# Patient Record
Sex: MALE | Race: WHITE | Employment: OTHER | URBAN - METROPOLITAN AREA
[De-identification: names, ages, dates, MRNs, and addresses within clinical notes are randomized per-mention and may not be internally consistent; named-entity substitution may affect disease eponyms.]

---

## 2017-09-22 ENCOUNTER — OFFICE VISIT (OUTPATIENT)
Dept: PULMONOLOGY | Age: 82
End: 2017-09-22

## 2017-09-22 ENCOUNTER — TELEPHONE (OUTPATIENT)
Dept: PULMONOLOGY | Age: 82
End: 2017-09-22

## 2017-09-22 VITALS
DIASTOLIC BLOOD PRESSURE: 66 MMHG | SYSTOLIC BLOOD PRESSURE: 120 MMHG | HEIGHT: 66 IN | OXYGEN SATURATION: 93 % | HEART RATE: 62 BPM | BODY MASS INDEX: 29.57 KG/M2 | WEIGHT: 184 LBS

## 2017-09-22 DIAGNOSIS — J44.9 CHRONIC OBSTRUCTIVE PULMONARY DISEASE, UNSPECIFIED COPD TYPE (HCC): Primary | ICD-10-CM

## 2017-09-22 DIAGNOSIS — J96.11 CHRONIC RESPIRATORY FAILURE WITH HYPOXIA (HCC): ICD-10-CM

## 2017-09-22 PROCEDURE — 99205 OFFICE O/P NEW HI 60 MIN: CPT | Performed by: INTERNAL MEDICINE

## 2017-09-22 RX ORDER — MECLIZINE HYDROCHLORIDE 25 MG/1
25 TABLET ORAL 3 TIMES DAILY PRN
COMMUNITY

## 2017-09-22 RX ORDER — TAMSULOSIN HYDROCHLORIDE 0.4 MG/1
0.4 CAPSULE ORAL DAILY
COMMUNITY

## 2017-09-22 RX ORDER — ALBUTEROL SULFATE 2.5 MG/3ML
2.5 SOLUTION RESPIRATORY (INHALATION) EVERY 6 HOURS PRN
Qty: 120 EACH | Refills: 5 | Status: SHIPPED | OUTPATIENT
Start: 2017-09-22

## 2017-09-22 RX ORDER — ALBUTEROL SULFATE 90 UG/1
2 AEROSOL, METERED RESPIRATORY (INHALATION) EVERY 6 HOURS PRN
Qty: 1 INHALER | Refills: 5 | Status: SHIPPED | OUTPATIENT
Start: 2017-09-22

## 2017-09-22 RX ORDER — ALBUTEROL SULFATE 90 UG/1
2 AEROSOL, METERED RESPIRATORY (INHALATION) EVERY 6 HOURS PRN
COMMUNITY

## 2017-09-22 RX ORDER — FINASTERIDE 5 MG/1
5 TABLET, FILM COATED ORAL DAILY
COMMUNITY

## 2017-09-22 RX ORDER — ALLOPURINOL 300 MG/1
300 TABLET ORAL DAILY
COMMUNITY

## 2017-09-22 RX ORDER — DILTIAZEM HYDROCHLORIDE 120 MG/1
120 CAPSULE, EXTENDED RELEASE ORAL DAILY
COMMUNITY

## 2017-09-22 RX ORDER — ATORVASTATIN CALCIUM 10 MG/1
10 TABLET, FILM COATED ORAL DAILY
COMMUNITY

## 2017-09-22 RX ORDER — BUDESONIDE AND FORMOTEROL FUMARATE DIHYDRATE 160; 4.5 UG/1; UG/1
2 AEROSOL RESPIRATORY (INHALATION) 2 TIMES DAILY
COMMUNITY

## 2017-09-22 RX ORDER — FUROSEMIDE 20 MG/1
20 TABLET ORAL 2 TIMES DAILY
COMMUNITY

## 2017-09-22 RX ORDER — SPIRONOLACTONE 25 MG/1
25 TABLET ORAL DAILY
COMMUNITY

## 2017-09-22 RX ORDER — HYDROCODONE BITARTRATE AND ACETAMINOPHEN 5; 325 MG/1; MG/1
1 TABLET ORAL EVERY 6 HOURS PRN
COMMUNITY

## 2017-11-02 ENCOUNTER — OFFICE VISIT (OUTPATIENT)
Dept: PULMONOLOGY | Age: 82
End: 2017-11-02

## 2017-11-02 ENCOUNTER — HOSPITAL ENCOUNTER (OUTPATIENT)
Dept: PULMONOLOGY | Age: 82
Discharge: HOME | End: 2017-11-02
Attending: INTERNAL MEDICINE | Admitting: INTERNAL MEDICINE

## 2017-11-02 VITALS
OXYGEN SATURATION: 93 % | RESPIRATION RATE: 18 BRPM | WEIGHT: 186 LBS | HEART RATE: 81 BPM | BODY MASS INDEX: 29.89 KG/M2 | HEIGHT: 66 IN | TEMPERATURE: 98.7 F | DIASTOLIC BLOOD PRESSURE: 68 MMHG | SYSTOLIC BLOOD PRESSURE: 122 MMHG

## 2017-11-02 DIAGNOSIS — J44.9 CHRONIC OBSTRUCTIVE PULMONARY DISEASE, UNSPECIFIED COPD TYPE (HCC): ICD-10-CM

## 2017-11-02 DIAGNOSIS — J96.11 CHRONIC RESPIRATORY FAILURE WITH HYPOXIA, ON HOME O2 THERAPY (HCC): ICD-10-CM

## 2017-11-02 DIAGNOSIS — Z99.81 CHRONIC RESPIRATORY FAILURE WITH HYPOXIA, ON HOME O2 THERAPY (HCC): ICD-10-CM

## 2017-11-02 DIAGNOSIS — J44.9 COPD, SEVERE (HCC): Primary | ICD-10-CM

## 2017-11-02 DIAGNOSIS — J44.9 CHRONIC OBSTRUCTIVE PULMONARY DISEASE (HCC): ICD-10-CM

## 2017-11-02 PROCEDURE — 99214 OFFICE O/P EST MOD 30 MIN: CPT | Performed by: INTERNAL MEDICINE

## 2017-11-02 RX ORDER — PREDNISONE 10 MG/1
10 TABLET ORAL DAILY
COMMUNITY

## 2017-11-02 RX ORDER — ALBUTEROL SULFATE 2.5 MG/3ML
2.5 SOLUTION RESPIRATORY (INHALATION) EVERY 6 HOURS PRN
Status: DISCONTINUED | OUTPATIENT
Start: 2017-11-02 | End: 2017-11-03 | Stop reason: HOSPADM

## 2017-11-02 RX ADMIN — ALBUTEROL SULFATE 2.5 MG: 2.5 SOLUTION RESPIRATORY (INHALATION) at 11:12

## 2017-11-02 NOTE — PROGRESS NOTES
[facility] Pulmonary, Critical Care, and Sleep    Outpatient Follow Up Note    Chief Complaint: Severe COPD  Consulting provider: Lakesha Keller MD    Interval History: 81 y.o. male SOB is stable. The Incruse has helped. Recent URI or pneumonia treated outpatient and he is back to baseline. Initial HPI:The patient does not have SOB at rest but has BERRY with walking or ADLs. Exercise tolerance on level ground is < 1 block. + SOB with showering or dressing. + less than daily intermittent cough that is usually dry or sometimes productive of white sputum. + wheezes daily. He has been admitted to [facility] several times for COPD or URI in the last year.     Current Outpatient Prescriptions:     predniSONE (DELTASONE) 10 MG tablet, Take 10 mg by mouth daily Take 2 tablets by mouth every day for 2 wks, then take 1 tablet ever day until gone., Disp: , Rfl:     meclizine (ANTIVERT) 25 MG tablet, Take 25 mg by mouth 3 times daily as needed, Disp: , Rfl:     atorvastatin (LIPITOR) 10 MG tablet, Take 10 mg by mouth daily, Disp: , Rfl:     furosemide (LASIX) 20 MG tablet, Take 20 mg by mouth 2 times daily, Disp: , Rfl:     diltiazem (CARDIZEM 12 HR) 120 MG extended release capsule, Take 120 mg by mouth daily, Disp: , Rfl:     finasteride (PROSCAR) 5 MG tablet, Take 5 mg by mouth daily, Disp: , Rfl:     spironolactone (ALDACTONE) 25 MG tablet, Take 25 mg by mouth daily, Disp: , Rfl:     tamsulosin (FLOMAX) 0.4 MG capsule, Take 0.4 mg by mouth daily, Disp: , Rfl:     allopurinol (ZYLOPRIM) 300 MG tablet, Take 300 mg by mouth daily 1/2 tab daily (150 mg daily), Disp: , Rfl:     albuterol sulfate  (90 Base) MCG/ACT inhaler, Inhale 2 puffs into the lungs every 6 hours as needed for Wheezing, Disp: , Rfl:     budesonide-formoterol (SYMBICORT) 160-4.5 MCG/ACT AERO, Inhale 2 puffs into the lungs 2 times daily, Disp: , Rfl:     HYDROcodone-acetaminophen (NORCO) 5-325 MG per tablet, Take 1 tablet by mouth every 6 hours months

## 2017-11-02 NOTE — PATIENT INSTRUCTIONS
might gain a few pounds. But quitting smoking will have a much more positive effect on your health than weighing a few pounds more. Plus, you can help prevent some weight gain by being more active and eating less. Taking the medicine bupropion might help control weight gain. What else can I do to improve my chances of quitting?  You can:  ?Start exercising. ?Stay away from smokers and places that you associate with smoking. If people close to you smoke, ask them to quit with you. ? Keep gum, hard candy, or something to put in your mouth handy. If you get a craving for a cigarette, try one of these instead. ?Dont give up, even if you start smoking again. It takes most people a few tries before they succeed. You can also get help from a free phone line (9-574-QUIT-NOW) or go online to www.smokefree.gov. Your pulmonary team is here to help you quit.   Call for assistance [phone]

## 2018-05-01 ENCOUNTER — TELEPHONE (OUTPATIENT)
Dept: PULMONOLOGY | Age: 83
End: 2018-05-01

## 2019-12-18 ENCOUNTER — TELEPHONE (OUTPATIENT)
Dept: SURGERY | Age: 84
End: 2019-12-18

## 2020-07-24 ENCOUNTER — TELEPHONE (OUTPATIENT)
Dept: INTERVENTIONAL RADIOLOGY/VASCULAR | Age: 85
End: 2020-07-24

## 2020-07-24 NOTE — TELEPHONE ENCOUNTER
7/24/2020--  Terrence Dowell from [facility] called to get patient approved for kyphoplasty based on their recent CT imaging. (Patient unable to have MRI due to their pacemaker that is in place). After reviewing the imaging, Dr. Guerrero Norman has not approved the procedure to be scheduled yet at this time. He states that there is an increased L2 fracture, a new L1 and L4 compression fracture and that T12 may be questionable as well. Due to not being able to perform the MRI, he recommends that a bone scan with SPECT be performed to better evaluate the levels and degree of activity. This will give a better idea of what levels need to be treated first over others. This information was given to Terrence Dowell as well as the PA covering patients care so they can place the order and get this scheduled for patient. They will call when it has been performed for our radiologist to review the new imaging in comparison to the CT scans.

## 2020-07-28 NOTE — PROGRESS NOTES
Patient is inpatient at [facility] for pain control. Patient will be transferred to [facility] for Kyphoplasty. Patient will return to [facility] after procedure. Alfredo Chaves is pts sister [phone] (POA). Eliquis is to be held for 4 doses, COVID test needs completed. Reginald Masterson- made aware. Lindsay- anesthesia  @ [facility] has been made aware.   Belinda PARKER RN

## 2020-07-29 NOTE — TELEPHONE ENCOUNTER
7/28/2020--  Patient had their bone scan with SPECT on 7/27/2020. Dr. Sandy Joseph reviewed the new imaging in conjunction with the recent imaging and deemed that the patient is a candidate for an L1, L2 and L4 kyphoplasty. [facility] faxed us over all his recent progress notes and labs and those were also reviewed. The patient had a positive urine culture and Dr. Farris Collet required that they'd need to start on antibiotics now so that he'd be able to proceed forward with kyphoplasty next Wednesday 8/5/2020 at [facility].

## 2020-08-03 ENCOUNTER — TELEPHONE (OUTPATIENT)
Dept: INTERVENTIONAL RADIOLOGY/VASCULAR | Age: 85
End: 2020-08-03

## 2020-08-03 NOTE — TELEPHONE ENCOUNTER
8/3/2020--  Lana Morgan from [facility] called today in regards to patient's insurance pre-certification. His [place] Medicare [employer]vantage required the ordering provider to submit and initiate the pre-certification claim--they wouldn't let me do it representing the servicing provider. A peer to peer had to be set up today for Dr. Ranjit Francisco to have with [address] [place] [address] and after the Urgent peer to peer (as his 3 level kyphoplasty is scheduled in two days on 8/5/2020) it was approved.      Authorization # [id number]     It is valid from 8/5/2020-11/3/2020

## 2020-08-05 ENCOUNTER — HOSPITAL ENCOUNTER (OUTPATIENT)
Age: 85
Discharge: HOME | End: 2020-08-05
Payer: MEDICARE

## 2020-08-05 ENCOUNTER — HOSPITAL ENCOUNTER (OUTPATIENT)
Dept: INTERVENTIONAL RADIOLOGY/VASCULAR | Age: 85
Discharge: HOME | End: 2020-08-05
Payer: MEDICARE

## 2020-08-05 VITALS
RESPIRATION RATE: 20 BRPM | WEIGHT: 177 LBS | HEIGHT: 66 IN | SYSTOLIC BLOOD PRESSURE: 155 MMHG | OXYGEN SATURATION: 100 % | HEART RATE: 72 BPM | BODY MASS INDEX: 28.45 KG/M2 | DIASTOLIC BLOOD PRESSURE: 64 MMHG

## 2020-08-05 VITALS
SYSTOLIC BLOOD PRESSURE: 175 MMHG | OXYGEN SATURATION: 100 % | HEART RATE: 74 BPM | DIASTOLIC BLOOD PRESSURE: 66 MMHG | RESPIRATION RATE: 20 BRPM

## 2020-08-05 PROCEDURE — C1713 ANCHOR/SCREW BN/BN,TIS/BN: HCPCS

## 2020-08-05 PROCEDURE — 6360000002 HC RX W HCPCS: Performed by: RADIOLOGY

## 2020-08-05 PROCEDURE — 99152 MOD SED SAME PHYS/QHP 5/>YRS: CPT

## 2020-08-05 PROCEDURE — 22514 PERQ VERTEBRAL AUGMENTATION: CPT

## 2020-08-05 PROCEDURE — 22515 PERQ VERTEBRAL AUGMENTATION: CPT

## 2020-08-05 PROCEDURE — 99153 MOD SED SAME PHYS/QHP EA: CPT

## 2020-08-05 RX ORDER — ACETAMINOPHEN 325 MG/1
650 TABLET ORAL EVERY 4 HOURS PRN
Status: DISCONTINUED | OUTPATIENT
Start: 2020-08-05 | End: 2020-08-06 | Stop reason: HOSPADM

## 2020-08-05 RX ORDER — ONDANSETRON 2 MG/ML
4 INJECTION INTRAMUSCULAR; INTRAVENOUS EVERY 8 HOURS PRN
Status: DISCONTINUED | OUTPATIENT
Start: 2020-08-05 | End: 2020-08-06 | Stop reason: HOSPADM

## 2020-08-05 RX ORDER — FENTANYL CITRATE 50 UG/ML
INJECTION, SOLUTION INTRAMUSCULAR; INTRAVENOUS
Status: COMPLETED | OUTPATIENT
Start: 2020-08-05 | End: 2020-08-05

## 2020-08-05 RX ORDER — HYDROCODONE BITARTRATE AND ACETAMINOPHEN 5; 325 MG/1; MG/1
1 TABLET ORAL EVERY 4 HOURS PRN
Status: DISCONTINUED | OUTPATIENT
Start: 2020-08-05 | End: 2020-08-06 | Stop reason: HOSPADM

## 2020-08-05 RX ORDER — KETOROLAC TROMETHAMINE 30 MG/ML
INJECTION, SOLUTION INTRAMUSCULAR; INTRAVENOUS
Status: COMPLETED | OUTPATIENT
Start: 2020-08-05 | End: 2020-08-05

## 2020-08-05 RX ORDER — HYDROCODONE BITARTRATE AND ACETAMINOPHEN 5; 325 MG/1; MG/1
2 TABLET ORAL EVERY 4 HOURS PRN
Status: DISCONTINUED | OUTPATIENT
Start: 2020-08-05 | End: 2020-08-06 | Stop reason: HOSPADM

## 2020-08-05 RX ORDER — MIDAZOLAM HYDROCHLORIDE 5 MG/ML
INJECTION INTRAMUSCULAR; INTRAVENOUS
Status: COMPLETED | OUTPATIENT
Start: 2020-08-05 | End: 2020-08-05

## 2020-08-05 RX ORDER — SODIUM CHLORIDE 9 MG/ML
INJECTION, SOLUTION INTRAVENOUS CONTINUOUS
Status: DISCONTINUED | OUTPATIENT
Start: 2020-08-05 | End: 2020-08-06 | Stop reason: HOSPADM

## 2020-08-05 RX ORDER — KETOROLAC TROMETHAMINE 30 MG/ML
30 INJECTION, SOLUTION INTRAMUSCULAR; INTRAVENOUS ONCE
Status: DISCONTINUED | OUTPATIENT
Start: 2020-08-05 | End: 2020-08-06 | Stop reason: HOSPADM

## 2020-08-05 RX ADMIN — MIDAZOLAM HYDROCHLORIDE 1 MG: 5 INJECTION, SOLUTION INTRAMUSCULAR; INTRAVENOUS at 08:57

## 2020-08-05 RX ADMIN — MIDAZOLAM HYDROCHLORIDE 1 MG: 5 INJECTION, SOLUTION INTRAMUSCULAR; INTRAVENOUS at 08:41

## 2020-08-05 RX ADMIN — KETOROLAC TROMETHAMINE 30 MG: 30 INJECTION, SOLUTION INTRAMUSCULAR at 09:30

## 2020-08-05 RX ADMIN — MIDAZOLAM HYDROCHLORIDE 1 MG: 5 INJECTION, SOLUTION INTRAMUSCULAR; INTRAVENOUS at 08:53

## 2020-08-05 RX ADMIN — Medication 2 G: at 08:42

## 2020-08-05 RX ADMIN — FENTANYL CITRATE 50 MCG: 50 INJECTION INTRAMUSCULAR; INTRAVENOUS at 08:57

## 2020-08-05 RX ADMIN — FENTANYL CITRATE 50 MCG: 50 INJECTION INTRAMUSCULAR; INTRAVENOUS at 09:36

## 2020-08-05 RX ADMIN — FENTANYL CITRATE 50 MCG: 50 INJECTION INTRAMUSCULAR; INTRAVENOUS at 08:53

## 2020-08-05 RX ADMIN — FENTANYL CITRATE 50 MCG: 50 INJECTION INTRAMUSCULAR; INTRAVENOUS at 08:41

## 2020-08-05 NOTE — PRE SEDATION
mouth daily    Historical Provider, MD   allopurinol (ZYLOPRIM) 300 MG tablet Take 300 mg by mouth daily 1/2 tab daily (150 mg daily)    Historical Provider, MD   albuterol sulfate  (90 Base) MCG/ACT inhaler Inhale 2 puffs into the lungs every 6 hours as needed for Wheezing    Historical Provider, MD   budesonide-formoterol (SYMBICORT) 160-4.5 MCG/ACT AERO Inhale 2 puffs into the lungs 2 times daily    Historical Provider, MD   HYDROcodone-acetaminophen (NORCO) 5-325 MG per tablet Take 1 tablet by mouth every 6 hours as needed for Pain . Historical Provider, MD   Ergocalciferol (VITAMIN D2 PO) Take by mouth    Historical Provider, MD   aspirin 81 MG tablet Take 81 mg by mouth daily    Historical Provider, MD   Umeclidinium Bromide 62.5 MCG/INH AEPB 1 inhalation daily 9/22/17   Eunice Spence MD   albuterol (PROVENTIL) (2.5 MG/3ML) 0.083% nebulizer solution Take 3 mLs by nebulization every 6 hours as needed for Wheezing or Shortness of Breath Dx COPD J44.9 9/22/17   Eunice Spence MD   albuterol sulfate HFA (PROVENTIL HFA) 108 (90 Base) MCG/ACT inhaler Inhale 2 puffs into the lungs every 6 hours as needed for Wheezing or Shortness of Breath 9/22/17   Eunice Spence MD     Coumadin Use Last 7 Days:  no  Antiplatelet drug therapy use last 7 days: no  Other anticoagulant use last 7 days: no  Additional Medication Information:  na      Pre-Sedation Documentation and Exam:   I have reviewed the patient's history and review of systems.     Mallampati Airway Assessment:  Mallampati Class II - (soft palate, fauces & uvula are visible)    Prior History of Anesthesia Complications:   none    ASA Classification:  Class 2 - A normal healthy patient with mild systemic disease    Sedation/ Anesthesia Plan:   intravenous sedation    Medications Planned:   midazolam (Versed) intravenously and fentanyl intravenously    Patient is an appropriate candidate for plan of sedation: yes    Electronically signed by Libby Moreland Sintia Farias MD on 8/5/2020 at 8:26 AM

## 2020-08-05 NOTE — PROGRESS NOTES
Pt cleared for discharge. Pt alert and oriented x 4. Denies any pain or numbness/tingling in lower extremities. Surgical site dressing clean, dry, and intact. Vital signs stable see flow sheets. Post Ihsan score 10. Discharge instructions provided and explained. All questions answered. Pt and daughter verbalize understanding of the discharge instructions and state no more questions.  Pt discharged in stable condition with all belongings in care of

## 2020-08-05 NOTE — PROGRESS NOTES
Pt arrived for image guided kyphoplasty. Procedure explained including the risk and benefits of the procedure. All questions answered. Pt verbalizes understanding of the procedure and states no more questions. Consent signed. Vital signs stable see flow sheets. Labs, allergies, medications, and code status reviewed. No Contraindications noted. Pre Ihsan score 9.

## 2020-08-05 NOTE — BRIEF OP NOTE
Brief Operative Note      Patient: Saranya Smith MRN: [id number]     YOB: 1935  Age: 81 y.o. Sex: male        DATE OF PROCEDURE: 8/5/2020     PROCEDURE PERFORMED: Three level kyphoplasty, L1,2 and 4. SURGEON: Bertha Bond MD    ANESTHESIA: Fentanyl, Versed    Estimated Blood Loss:less than 20 cc    Complications: None. Device implanted: cement.            Specimen: none    Electronically signed by Bertha Bond MD on 8/5/2020 at 9:51 AM

## 2020-08-05 NOTE — PROGRESS NOTES
Image guided kyphoplasty of the L1, L2 and L4 completed. Pt tolerated procedure without any signs or symptoms of distress. Vital signs stable see flow sheets. Pt taken to recovery bay for post operative monitoring.

## 2020-12-21 ENCOUNTER — OUTSIDE SERVICES (OUTPATIENT)
Dept: WOUND CARE | Age: 85
End: 2020-12-21
Payer: MEDICARE

## 2020-12-21 PROCEDURE — 99308 SBSQ NF CARE LOW MDM 20: CPT | Performed by: CLINICAL NURSE SPECIALIST

## 2020-12-21 NOTE — PROGRESS NOTES
[facility]    Patient name: Sara Mtz  :   [birth year]  Facility:  [facility] Service: nursing facility (79)    Primary diagnosis for wound-care consultation: Skin tear to right antecubital, right elbow and right wrist    Additional ulcer(s) noted? None    History of Present Illness: Initial visit. Skin tear resultant from fall 2020. Right elbow resolved today. Current treatment is appropriate. Wounds are healing. Topical antibiotic ointment, Adaptic and nonadherent dressing covered with Kerlix. Recent Covid. Appetite is fair to good. 2020: Albumin 2.9. Protein supplements are provided. No fever or chills. Review of Systems: Pertinent systems reviewed in the HPI; all other systems reviewed, and negative. Pertinent elements of past medical, surgical, family, and/or social history: Patient with chronic diastolic congestive heart failure, BPH, COPD, essential hypertension, hyperlipidemia, atherosclerotic heart disease, A. fib, emphysema, major depressive disorder and weakness.     Medications and allergies are detailed in the nursing home chart, and were reviewed by me today.  _______________________    General Physical exam:    Vital signs:  165/61, 97.7, 72, 19    General Appearance: alert and oriented to person, place and time, well developed and well-nourished, in no acute distress  Psychiatric:  Mood and affect appropriate for situation  Skin: warm and dry, no rash  Head: normocephalic and atraumatic  Eyes: pupils equal, round, sclerae anicteric, conjunctivae normal  ENT: no thrush or oral ulcers  Neck:No complaints, normal appearance  Pulmonary/Chest: Respirations easy at rest, supplemental oxygen therapy, no cough or respiratory distress  Cardiovascular: No chest pain, normal rate, toes warm, cap refill normal  Abdomen: No nausea or vomiting  Extremities: no cyanosis, edema or cellulitis Consent obtained. Time out performed per [facility]. _______________________    Recommendations:    - Dressings / Compression / Offloading: Topical antibiotic ointment, Adaptic, nonadherent dressing and gauze wrap.  Leave steri strips in place.    - Labs / Diagnostic studies: None    - Medications / nutritional support: Health shakes twice a day, Protostat every day    - Further Consultations recommended: None    - Anticipated follow-up: Weekly evaluation  _______________________    Electronically signed by ZOILA Velez CNP on 12/21/2020 at 5:34 PM

## 2020-12-28 ENCOUNTER — OUTSIDE SERVICES (OUTPATIENT)
Dept: WOUND CARE | Age: 85
End: 2020-12-28
Payer: MEDICARE

## 2020-12-28 PROCEDURE — 99308 SBSQ NF CARE LOW MDM 20: CPT | Performed by: CLINICAL NURSE SPECIALIST

## 2020-12-28 NOTE — PROGRESS NOTES
[facility]    Patient name: Jack Lucas  :   [birth year]  Facility:    [facility] Service: nursing facility (74)    Primary diagnosis for wound-care consultation: Skin tear to right antecubital and right wrist    Additional ulcer(s) noted? None    History of Present Illness: Skin tear to right wrist resolved today. Right antecubital skin tear healing slowly. Appetite is fair to good. Protein supplements are provided. No fever or chills. Review of Systems: Pertinent systems reviewed in the HPI; all other systems reviewed, and negative. Pertinent elements of past medical, surgical, family, and/or social history: Patient with chronic diastolic congestive heart failure, bilateral leg edema, BPH, COPD, essential hypertension, hyperlipidemia, atherosclerotic heart disease, A. fib, emphysema, major depressive disorder and weakness. Medications and allergies are detailed in the nursing home chart, and were reviewed by me today.  _______________________    General Physical exam:    Vital signs:  149/63, 97.7, 69, 18    General Appearance: alert and oriented to person, place and time, well developed and well-nourished, in no acute distress  Psychiatric:  Mood and affect appropriate for situation  Skin: warm and dry, no rash  Head: normocephalic and atraumatic  Eyes: pupils equal, round, sclerae anicteric, conjunctivae normal  ENT: no thrush or oral ulcers  Neck:No complaints, normal appearance  Pulmonary/Chest: Respirations easy at rest, supplemental O2, no cough or respiratory distress  Cardiovascular: No chest pain, normal rate, toes warm, cap refill normal  Abdomen: No nausea or vomiting  Extremities: no cyanosis or cellulitis. Bilateral leg edema  Musculoskeletal: Wheelchair for distance, moves all extremities, no deformities  Neurologic: distal sensation to light touch intact, no allodynia.       Wound exam:    Wound location: Right antecubital Length (cm) 2.5   Width (cm) 0.6   Depth (cm) 0.1   Tunneling 0   Undermining 0    Wound type:   Skin Tear  Grade - stage - thickness: Full thickness     Description of periwound: Intact    Description of wound bed: Wound with red granulation. Wound bed moist, small amount of serous drainage, edges open and attached. Surrounding tissue and ulcer without signs and symptoms of infection. No purulence, malodor, erythema, increased temperature, or increased pain.  _______________________    Recent labs and data reviewed: 12/17/2020: Hemoglobin/hematocrit: 9.5/30, RBC: 3.37 both improving  _______________________     Fairy Duane diagnoses & assessment: Skin tear to right wrist resolved today. Right antecubital skin tear healing slowly. Appetite is fair to good. Protein supplements are provided. No fever or chills    Debridement is not indicated today, based on the history and exam above.  _______________________    Procedure:    Consent obtained. Time out performed per [facility].      _______________________    Recommendations:    - Dressings / Compression / Offloading: Change treatment to topical antibiotic, Adaptic and adhesive foam.    - Labs / Diagnostic studies: None    - Medications / nutritional support: Health shakes 2 times a day, Prostat daily    - Further Consultations recommended: none    - Anticipated follow-up: Weekly evaluation  _______________________    Electronically signed by ZOILA Menjivar CNP on 12/28/2020 at 5:57 PM

## 2021-01-04 ENCOUNTER — OUTSIDE SERVICES (OUTPATIENT)
Dept: WOUND CARE | Age: 86
End: 2021-01-04
Payer: MEDICARE

## 2021-01-04 DIAGNOSIS — S51.811D SKIN TEAR OF FOREARM WITHOUT COMPLICATION, RIGHT, SUBSEQUENT ENCOUNTER: ICD-10-CM

## 2021-01-04 PROCEDURE — 99308 SBSQ NF CARE LOW MDM 20: CPT | Performed by: CLINICAL NURSE SPECIALIST

## 2021-01-04 NOTE — PROGRESS NOTES
88 [facility]    Patient name: Lawson Huynh  :   [birth year]  Facility:    Service: nursing facility (68)    Primary diagnosis for wound-care consultation: Skin tear to right arm    Additional ulcer(s) noted?  none    History of Present Illness: Wound with red hypergranulation. Wound bed moist, moderate amount of serous drainage, edges open and attached. Surrounding tissue and ulcer without signs and symptoms of infection. No purulence, malodor, erythema, increased temperature, or increased pain. Appetite fair to good. Health shakes and Prostat for protein supplements provided. No fever or chills. Review of Systems: Pertinent systems reviewed in the HPI; all other systems reviewed, and negative. Pertinent elements of past medical, surgical, family, and/or social history: Patient with chronic diastolic congestive heart failure, COPD, BPH, essential hypertension, hyperlipidemia, atherosclerotic heart disease, A. fib, emphysema, major depressive disorder and weakness.     Medications and allergies are detailed in the nursing home chart, and were reviewed by me today.  _______________________    General Physical exam:    Vital signs:  135/39, 97.7, 69, 18    General Appearance: alert and oriented to person, place and time, well developed and well-nourished, in no acute distress  Psychiatric:  Mood and affect appropriate for situation  Skin: warm and dry, no rash  Head: normocephalic and atraumatic  Eyes: pupils equal, round, sclerae anicteric, conjunctivae normal  ENT: no thrush or oral ulcers  Neck:No complaints, normal appearance  Pulmonary/Chest: Respirations easy at rest, no cough or respiratory distress  Cardiovascular: No chest pain, normal rate, toes warm, cap refill normal  Abdomen: No nausea or vomiting  Extremities: no cyanosis, edema or cellulitis  Musculoskeletal: Wheel chair for distance, moves all extremities, no deformities Neurologic: distal sensation to light touch intact, no allodynia. Wound exam:    Wound location: Right arm   Length (cm) 1.4   Width (cm) 0.7   Depth (cm) 0.1   Tunneling 0   Undermining 0    Wound type:   Skin Tear  Grade - stage - thickness: Full thickness     Description of periwound: Intact    Description of wound bed: Wound with red hypergranulation. Wound bed moist, moderate amount of serous drainage, edges open and attached. Surrounding tissue and ulcer without signs and symptoms of infection. No purulence, malodor, erythema, increased temperature, or increased pain.  _______________________    Recent labs and data reviewed: No new labs  _______________________     [facility] diagnoses & assessment: Wound with red hypergranulation. Silver nitrate applied today. Change treatment to collagen foam dressing. Wound bed moist, moderate amount of serous drainage, edges open and attached. Surrounding tissue and ulcer without signs and symptoms of infection. No purulence, malodor, erythema, increased temperature, or increased pain. Appetite fair to good. Health shakes and Prostat for protein supplements provided. No fever or chills. Debridement is not indicated today, based on the history and exam above.  _______________________    Procedure:    Consent obtained. Time out performed per [facility]. To encourage better epithelial cell coverage, I did use AgNO3 to chemically cauterize hypergranulation tissue on the right arm ulcer(s), after application of 4% lidocaine topical solution. This was tolerated well, with no pain or skin injury. _______________________    Recommendations:    - Dressings / Compression / Offloading: Collagen and foam dressing.    - Labs / Diagnostic studies: None    - Medications / nutritional support: Prostat daily.  Health shakes 2 times per day    - Further Consultations recommended: none    - Anticipated follow-up: Weekly evaluation  _______________________ Electronically signed by ZOILA Willis CNP on 1/4/2021 at 4:41 PM

## 2021-01-11 ENCOUNTER — OUTSIDE SERVICES (OUTPATIENT)
Dept: WOUND CARE | Age: 86
End: 2021-01-11
Payer: MEDICARE

## 2021-01-11 DIAGNOSIS — S41.111D SKIN TEAR OF RIGHT UPPER ARM WITHOUT COMPLICATION, SUBSEQUENT ENCOUNTER: ICD-10-CM

## 2021-01-11 PROCEDURE — 17250 CHEM CAUT OF GRANLTJ TISSUE: CPT | Performed by: CLINICAL NURSE SPECIALIST

## 2021-01-11 NOTE — PROGRESS NOTES
88 [facility]    Patient name: Izabela Bustillo  :   [birth year]  Facility:   [facility] Service: nursing facility (22)    Primary diagnosis for wound-care consultation: Skin tear right arm    Additional ulcer(s) noted?  none    History of Present Illness: Skin tear right arm. Healing slowly. No infection. Appetite is fair to good. Protein supplements provided. No fever or chills. Review of Systems: Pertinent systems reviewed in the HPI; all other systems reviewed, and negative. Pertinent elements of past medical, surgical, family, and/or social history: Patient with chronic diastolic CHF, COPD, essentia HTN, major depressive disorder. Thin, fragile skin    Medications and allergies are detailed in the nursing home chart, and were reviewed by me today.  _______________________    General Physical exam:    Vital signs:  130/60, 98, 70, 18    General Appearance: alert and oriented to person, place and time, well developed and well-nourished, in no acute distress  Psychiatric:  Mood and affect appropriate for situation  Skin: warm and dry, no rash  Head: normocephalic and atraumatic  Eyes: pupils equal, round, sclerae anicteric, conjunctivae normal  ENT: no thrush or oral ulcers  Neck:No complaints, normal appearance  Pulmonary/Chest: Respirations easy at rest, supplemental O2, no cough or respiratory distress  Cardiovascular: No chest pain, normal rate, toes warm, cap refill normal  Abdomen: No nausea or vomiting  Extremities: no cyanosis, edema or cellulitis  Musculoskeletal: Wheelchair for distance, moves all extremities, no deformities  Neurologic: distal sensation to light touch intact, no allodynia.       Wound exam:    Wound location: Right arm   Length (cm) 0.8   Width (cm) 0.3   Depth (cm) 0.1   Tunneling 0   Undermining 0    Wound type:   Skin Tear  Grade - stage - thickness: Full thickness     Description of periwound: Intact Description of wound bed: Wound with red hypergranulation. Wound bed moist, small amount of serous drainage, edges open and attached. Surrounding tissue and ulcer without signs and symptoms of infection. No purulence, malodor, erythema, increased temperature, or increased pain.  _______________________    Recent labs and data reviewed: No new labs  _______________________     [facility] diagnoses & assessment: Skin tear right arm. Healing slowly. No infection. Appetite is fair to good. Protein supplements provided. No fever or chills. Debridement is  indicated today, based on the history and exam above.  _______________________    Procedure:    Consent obtained. Time out performed per [facility]. To encourage better epithelial cell coverage, I did use AgNO3 to chemically cauterize hypergranulation tissue on the right arm ulcer(s), after application of 4% lidocaine topical solution. This was tolerated well, with no pain or skin injury.    _______________________    Recommendations:    - Dressings / Compression / Offloading: Alginate ag and adhesive foam. Su sleeves    - Labs / Diagnostic studies: none    - Medications / nutritional support: Health shakes 2 times per day, Prostat daily    - Further Consultations recommended: None    - Anticipated follow-up: Weekly evaluation  _______________________    Electronically signed by ZOILA Aguilar CNP on 1/11/2021 at 2:56 PM

## 2021-01-18 ENCOUNTER — OUTSIDE SERVICES (OUTPATIENT)
Dept: WOUND CARE | Age: 86
End: 2021-01-18
Payer: MEDICARE

## 2021-01-18 DIAGNOSIS — S51.819D: ICD-10-CM

## 2021-01-18 PROCEDURE — 17250 CHEM CAUT OF GRANLTJ TISSUE: CPT | Performed by: CLINICAL NURSE SPECIALIST

## 2021-01-19 NOTE — PROGRESS NOTES
[facility]    Patient name: David Flores  :   [birth year]  Facility:    [facility] Service: nursing facility ([id number])    Primary diagnosis for wound-care consultation: Skin tear right arm    Additional ulcer(s) noted? None    History of Present Illness: Skin tear to right arm, healing slowly. Hyper-granulated. Thin, fragile skin. Wearing Su sleeves. Appetite is fair to good. Protein supplements are provided. No fever or chills. Review of Systems: Pertinent systems reviewed in the HPI; all other systems reviewed, and negative. Pertinent elements of past medical, surgical, family, and/or social history: Patient with chronic diastolic congestive heart failure, BPH, COPD, essential hypertension, hyperlipidemia, atherosclerotic heart disease, A. fib, emphysema, major depressive disorder and weakness. Medications and allergies are detailed in the nursing home chart, and were reviewed by me today.  _______________________    General Physical exam:    Vital signs:  116/48, 98, 70, 16    General Appearance: alert and oriented to person, place and time, well developed and well-nourished, in no acute distress  Psychiatric:  Mood and affect appropriate for situation  Skin: warm and dry, no rash  Head: normocephalic and atraumatic  Eyes: pupils equal, round, sclerae anicteric, conjunctivae normal  ENT: no thrush or oral ulcers  Neck:No complaints, normal appearance  Pulmonary/Chest: Respirations easy at rest, no cough or respiratory distress, supplemental O2  Cardiovascular: No chest pain, normal rate, toes warm, cap refill normal  Abdomen: No nausea or vomiting  Extremities: no cyanosis, edema or cellulitis  Musculoskeletal: Wheel chair for distance, moves all extremities, no deformities  Neurologic: distal sensation to light touch intact, no allodynia.       Wound exam:    Wound location: Right arm   Length (cm) 0.5   Width (cm) 0.5   Depth (cm) 0.1 Tunneling 0   Undermining 0    Wound type:   Skin Tear  Grade - stage - thickness: Full thickness     Description of periwound: Thin, fragile skin    Description of wound bed: Wound with red hypergranulation. Wound bed moist, small amount of serous drainage, edges open and attached. Surrounding tissue and ulcer without signs and symptoms of infection. No purulence, malodor, erythema, increased temperature, or increased pain.  _______________________    Recent labs and data reviewed: No new labs  _______________________     Tina Stabs diagnoses & assessment: Skin tear to right arm, healing slowly. Hyper-granulated. Thin, fragile skin. Wearing Su sleeves. Appetite is fair to good. Protein supplements are provided. No fever or chills    Debridement is not indicated today, based on the history and exam above.  _______________________    Procedure:    Consent obtained. Time out performed per [facility]. To encourage better epithelial cell coverage, I did use AgNO3 to chemically cauterize hypergranulation tissue on the right arm ulcer(s), after application of 4% lidocaine topical solution. This was tolerated well, with no pain or skin injury. _______________________    Recommendations:    - Dressings / Compression / Offloading: Collagen and foam dressing. Su sleeves. - Labs / Diagnostic studies: None    - Medications / nutritional support: Health shakes 2 times a day.   Prostat daily.    - Further Consultations recommended: None    - Anticipated follow-up: Weekly evaluation  _______________________    Electronically signed by ZOILA Rodriguez CNP on 1/18/2021 at 7:34 PM

## 2021-01-25 ENCOUNTER — OUTSIDE SERVICES (OUTPATIENT)
Dept: WOUND CARE | Age: 86
End: 2021-01-25
Payer: MEDICARE

## 2021-01-25 DIAGNOSIS — S51.819D: ICD-10-CM

## 2021-01-25 PROCEDURE — 99308 SBSQ NF CARE LOW MDM 20: CPT | Performed by: CLINICAL NURSE SPECIALIST

## 2021-01-25 NOTE — PROGRESS NOTES
[facility]    Patient name: Beverly Sawyer  :   [birth year]  Facility:    [facility] Service: nursing facility ([id number])    Primary diagnosis for wound-care consultation: Skin tear right forearm    Additional ulcer(s) noted? None    History of Present Illness: Skin tear right forearm, healing with current treatment. Appetite is fair to good. Protein supplements were provided. No fever or chills. Review of Systems: Pertinent systems reviewed in the HPI; all other systems reviewed, and negative. Pertinent elements of past medical, surgical, family, and/or social history: Patient with chronic diastolic congestive heart failure, BPH, COPD, essential hypertension, hyperlipidemia, atherosclerotic heart disease, A. fib, emphysema, major depressive disorder and weakness    Medications and allergies are detailed in the nursing home chart, and were reviewed by me today.  _______________________    General Physical exam:    Vital signs:  112/67, 98.4, 116, 18    General Appearance: alert and oriented to person, place and time, well developed and well-nourished, in no acute distress  Psychiatric:  Mood and affect appropriate for situation  Skin: warm and dry, no rash  Head: normocephalic and atraumatic  Eyes: pupils equal, round, sclerae anicteric, conjunctivae normal  ENT: no thrush or oral ulcers  Neck:No complaints, normal appearance  Pulmonary/Chest: Respirations easy at rest, no cough or respiratory distress  Cardiovascular: No chest pain, normal rate, toes warm, cap refill normal  Abdomen: No nausea or vomiting  Extremities: no cyanosis, edema or cellulitis  Musculoskeletal: wheel chair for distanace, moves all extremities, no deformities  Neurologic: distal sensation to light touch intact, no allodynia.       Wound exam:    Wound location: Right forearm   Length (cm) 0.3   Width (cm) 0.1   Depth (cm) 0.1   Tunneling 0   Undermining 0